# Patient Record
Sex: FEMALE | Race: WHITE | URBAN - METROPOLITAN AREA
[De-identification: names, ages, dates, MRNs, and addresses within clinical notes are randomized per-mention and may not be internally consistent; named-entity substitution may affect disease eponyms.]

---

## 2022-04-28 ENCOUNTER — OFFICE VISIT (OUTPATIENT)
Dept: URBAN - METROPOLITAN AREA CLINIC 51 | Facility: CLINIC | Age: 77
End: 2022-04-28
Payer: MEDICARE

## 2022-04-28 DIAGNOSIS — H52.03 TEAR FILM INSUFFICIENCY OF BILATERAL LACRIMAL GLANDS: Primary | ICD-10-CM

## 2022-04-28 DIAGNOSIS — H43.313 VITREOUS MEMBRANES AND STRANDS, BILATERAL: ICD-10-CM

## 2022-04-28 DIAGNOSIS — Z96.1 PRESENCE OF INTRAOCULAR LENS: ICD-10-CM

## 2022-04-28 DIAGNOSIS — H52.4 PRESBYOPIA: ICD-10-CM

## 2022-04-28 PROCEDURE — 92004 COMPRE OPH EXAM NEW PT 1/>: CPT | Performed by: OPTOMETRIST

## 2022-04-28 ASSESSMENT — INTRAOCULAR PRESSURE
OD: 15
OS: 16

## 2022-04-28 ASSESSMENT — KERATOMETRY
OS: 41.52
OD: 40.84

## 2022-04-28 ASSESSMENT — VISUAL ACUITY
OS: 20/20
OD: 20/20

## 2022-04-28 NOTE — IMPRESSION/PLAN
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 Hx of Restasis - per patient didn't help/work Punctal plug RLL only Plan: Due to symptoms OD>OS; discussed reinserting punctal plug OS vs monitoring. Pt wishes to monitor at this time; can consider in future. Gave patient list of recommended drops -- tid OU; more as needed. Monitor.

## 2023-05-31 ENCOUNTER — OFFICE VISIT (OUTPATIENT)
Dept: URBAN - METROPOLITAN AREA CLINIC 51 | Facility: CLINIC | Age: 78
End: 2023-05-31
Payer: MEDICARE

## 2023-05-31 DIAGNOSIS — H52.03 TEAR FILM INSUFFICIENCY OF BILATERAL LACRIMAL GLANDS: ICD-10-CM

## 2023-05-31 DIAGNOSIS — H43.313 VITREOUS MEMBRANES AND STRANDS, BILATERAL: Primary | ICD-10-CM

## 2023-05-31 DIAGNOSIS — Z96.1 PRESENCE OF INTRAOCULAR LENS: ICD-10-CM

## 2023-05-31 PROCEDURE — 92134 CPTRZ OPH DX IMG PST SGM RTA: CPT | Performed by: OPTOMETRIST

## 2023-05-31 PROCEDURE — 92014 COMPRE OPH EXAM EST PT 1/>: CPT | Performed by: OPTOMETRIST

## 2023-05-31 ASSESSMENT — VISUAL ACUITY
OS: 20/25
OD: 20/20

## 2023-05-31 ASSESSMENT — INTRAOCULAR PRESSURE
OS: 16
OD: 16

## 2023-05-31 NOTE — IMPRESSION/PLAN
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 Hx of Restasis - per patient didn't help/work Punctal plug RLL only Plan: Continue AT's as needed for comfort if eyes ever feel dry, gritty, water or irritated.

## 2024-05-31 ENCOUNTER — OFFICE VISIT (OUTPATIENT)
Dept: URBAN - METROPOLITAN AREA CLINIC 51 | Facility: CLINIC | Age: 79
End: 2024-05-31
Payer: MEDICARE

## 2024-05-31 DIAGNOSIS — H52.03 TEAR FILM INSUFFICIENCY OF BILATERAL LACRIMAL GLANDS: Primary | ICD-10-CM

## 2024-05-31 DIAGNOSIS — H52.4 PRESBYOPIA: ICD-10-CM

## 2024-05-31 DIAGNOSIS — H43.313 VITREOUS MEMBRANES AND STRANDS, BILATERAL: ICD-10-CM

## 2024-05-31 DIAGNOSIS — Z96.1 PRESENCE OF INTRAOCULAR LENS: ICD-10-CM

## 2024-05-31 PROCEDURE — 92134 CPTRZ OPH DX IMG PST SGM RTA: CPT | Performed by: OPTOMETRIST

## 2024-05-31 PROCEDURE — 92014 COMPRE OPH EXAM EST PT 1/>: CPT | Performed by: OPTOMETRIST

## 2024-05-31 ASSESSMENT — KERATOMETRY
OD: 41.25
OS: 41.50

## 2024-05-31 ASSESSMENT — VISUAL ACUITY
OS: 20/20
OD: 20/20

## 2024-05-31 ASSESSMENT — INTRAOCULAR PRESSURE
OS: 17
OD: 17